# Patient Record
Sex: FEMALE | Race: OTHER
[De-identification: names, ages, dates, MRNs, and addresses within clinical notes are randomized per-mention and may not be internally consistent; named-entity substitution may affect disease eponyms.]

---

## 2021-01-07 NOTE — REP
INDICATION:

[employer] [facility]- [facility]S [facility]



COMPARISON:

None.



TECHNIQUE:

PA and lateral.



FINDINGS:

The mediastinum and cardiac silhouette are normal.  The lung fields are clear and

without acute consolidation, effusion, or pneumothorax.  The skeletal structures are

intact and normal.



IMPRESSION:

No acute cardiopulmonary process.





<Electronically signed by Joselito Shell > 01/07/21 0903

## 2021-02-08 ENCOUNTER — HOSPITAL ENCOUNTER (OUTPATIENT)
Dept: HOSPITAL 53 - M LABSMTC | Age: 21
End: 2021-02-08
Attending: PEDIATRICS

## 2021-02-08 DIAGNOSIS — Z11.52: Primary | ICD-10-CM

## 2021-09-08 ENCOUNTER — HOSPITAL ENCOUNTER (OUTPATIENT)
Dept: HOSPITAL 53 - M WUC | Age: 21
End: 2021-09-08
Attending: PHYSICIAN ASSISTANT
Payer: COMMERCIAL

## 2021-09-08 DIAGNOSIS — Z20.828: ICD-10-CM

## 2021-09-08 DIAGNOSIS — J06.9: ICD-10-CM

## 2021-09-08 DIAGNOSIS — J02.9: Primary | ICD-10-CM

## 2022-05-06 ENCOUNTER — HOSPITAL ENCOUNTER (OUTPATIENT)
Dept: HOSPITAL 53 - M PLALAB | Age: 22
End: 2022-05-06
Attending: ADVANCED PRACTICE MIDWIFE
Payer: COMMERCIAL

## 2022-05-06 DIAGNOSIS — Z53.20: Primary | ICD-10-CM

## 2022-05-09 ENCOUNTER — HOSPITAL ENCOUNTER (OUTPATIENT)
Dept: HOSPITAL 53 - M PLALAB | Age: 22
End: 2022-05-09
Attending: ADVANCED PRACTICE MIDWIFE
Payer: COMMERCIAL

## 2022-05-09 DIAGNOSIS — E03.9: ICD-10-CM

## 2022-05-09 DIAGNOSIS — Z36.9: Primary | ICD-10-CM

## 2022-05-09 LAB
BASOPHILS # BLD AUTO: 0 10^3/UL (ref 0–0.2)
BASOPHILS NFR BLD AUTO: 0.2 % (ref 0–1)
EOSINOPHIL # BLD AUTO: 0.2 10^3/UL (ref 0–0.5)
EOSINOPHIL NFR BLD AUTO: 1.5 % (ref 0–3)
HCT VFR BLD AUTO: 42.2 % (ref 36–47)
HCV AB SER QL: 0.2 INDEX (ref ?–0.8)
HGB BLD-MCNC: 14.2 G/DL (ref 12–15.5)
HIV 1+2 AB+HIV1 P24 AG SERPL QL IA: NEGATIVE
LYMPHOCYTES # BLD AUTO: 2 10^3/UL (ref 1.5–5)
LYMPHOCYTES NFR BLD AUTO: 19 % (ref 24–44)
MCH RBC QN AUTO: 28.3 PG (ref 27–33)
MCHC RBC AUTO-ENTMCNC: 33.6 G/DL (ref 32–36.5)
MCV RBC AUTO: 84.1 FL (ref 80–96)
MONOCYTES # BLD AUTO: 0.4 10^3/UL (ref 0–0.8)
MONOCYTES NFR BLD AUTO: 4.3 % (ref 2–8)
N GONORRHOEA RRNA SPEC QL NAA+PROBE: NEGATIVE
NEUTROPHILS # BLD AUTO: 7.7 10^3/UL (ref 1.5–8.5)
NEUTROPHILS NFR BLD AUTO: 74.5 % (ref 36–66)
PLATELET # BLD AUTO: 193 10^3/UL (ref 150–450)
RBC # BLD AUTO: 5.02 10^6/UL (ref 4–5.4)
T4 FREE SERPL-MCNC: 0.9 NG/DL (ref 0.76–1.46)
TSH SERPL DL<=0.005 MIU/L-ACNC: 1.31 UIU/ML (ref 0.36–3.74)
WBC # BLD AUTO: 10.3 10^3/UL (ref 4–10)

## 2022-06-08 ENCOUNTER — HOSPITAL ENCOUNTER (OUTPATIENT)
Dept: HOSPITAL 53 - M PLALAB | Age: 22
End: 2022-06-08
Attending: OBSTETRICS & GYNECOLOGY
Payer: COMMERCIAL

## 2022-06-08 DIAGNOSIS — Z53.9: Primary | ICD-10-CM

## 2022-07-25 ENCOUNTER — HOSPITAL ENCOUNTER (OUTPATIENT)
Dept: HOSPITAL 53 - M WHC | Age: 22
End: 2022-07-25
Attending: OBSTETRICS & GYNECOLOGY
Payer: COMMERCIAL

## 2022-07-25 DIAGNOSIS — Z3A.20: ICD-10-CM

## 2022-07-25 DIAGNOSIS — Z36.3: Primary | ICD-10-CM

## 2022-08-30 ENCOUNTER — HOSPITAL ENCOUNTER (OUTPATIENT)
Dept: HOSPITAL 53 - M WHC | Age: 22
End: 2022-08-30
Attending: OBSTETRICS & GYNECOLOGY
Payer: COMMERCIAL

## 2022-08-30 DIAGNOSIS — Z36.2: Primary | ICD-10-CM

## 2022-08-30 DIAGNOSIS — Z3A.25: ICD-10-CM

## 2022-09-15 ENCOUNTER — HOSPITAL ENCOUNTER (OUTPATIENT)
Dept: HOSPITAL 53 - M PLALAB | Age: 22
End: 2022-09-15
Attending: OBSTETRICS & GYNECOLOGY
Payer: COMMERCIAL

## 2022-09-15 DIAGNOSIS — Z34.02: Primary | ICD-10-CM

## 2022-09-15 DIAGNOSIS — Z3A.00: ICD-10-CM

## 2022-09-15 LAB
GLUCOSE 1H P 50 G GLC PO SERPL-MCNC: 89 MG/DL (ref ?–140)
HCT VFR BLD AUTO: 34.8 % (ref 36–47)
HGB BLD-MCNC: 11.4 G/DL (ref 12–15.5)
MCH RBC QN AUTO: 29.1 PG (ref 27–33)
MCHC RBC AUTO-ENTMCNC: 32.8 G/DL (ref 32–36.5)
MCV RBC AUTO: 88.8 FL (ref 80–96)
N GONORRHOEA RRNA SPEC QL NAA+PROBE: NEGATIVE
PLATELET # BLD AUTO: 142 10^3/UL (ref 150–450)
RBC # BLD AUTO: 3.92 10^6/UL (ref 4–5.4)
T4 FREE SERPL-MCNC: 0.68 NG/DL (ref 0.76–1.46)
TSH SERPL DL<=0.005 MIU/L-ACNC: 1.22 UIU/ML (ref 0.36–3.74)
WBC # BLD AUTO: 11.6 10^3/UL (ref 4–10)

## 2022-11-08 ENCOUNTER — HOSPITAL ENCOUNTER (OUTPATIENT)
Dept: HOSPITAL 53 - M PLALAB | Age: 22
End: 2022-11-08
Attending: OBSTETRICS & GYNECOLOGY
Payer: COMMERCIAL

## 2022-11-08 DIAGNOSIS — O99.113: Primary | ICD-10-CM

## 2022-11-08 LAB
HCT VFR BLD AUTO: 37.2 % (ref 36–47)
HGB BLD-MCNC: 12.2 G/DL (ref 12–15.5)
MCH RBC QN AUTO: 28.2 PG (ref 27–33)
MCHC RBC AUTO-ENTMCNC: 32.8 G/DL (ref 32–36.5)
MCV RBC AUTO: 86.1 FL (ref 80–96)
PLATELET # BLD AUTO: 145 10^3/UL (ref 150–450)
RBC # BLD AUTO: 4.32 10^6/UL (ref 4–5.4)
WBC # BLD AUTO: 11.4 10^3/UL (ref 4–10)

## 2022-11-08 PROCEDURE — 36415 COLL VENOUS BLD VENIPUNCTURE: CPT

## 2022-11-08 PROCEDURE — 87081 CULTURE SCREEN ONLY: CPT

## 2022-11-08 PROCEDURE — 85027 COMPLETE CBC AUTOMATED: CPT
